# Patient Record
Sex: FEMALE | Race: WHITE | URBAN - METROPOLITAN AREA
[De-identification: names, ages, dates, MRNs, and addresses within clinical notes are randomized per-mention and may not be internally consistent; named-entity substitution may affect disease eponyms.]

---

## 2017-04-21 ENCOUNTER — TELEPHONE (OUTPATIENT)
Dept: OBGYN | Facility: CLINIC | Age: 52
End: 2017-04-21

## 2017-04-21 NOTE — TELEPHONE ENCOUNTER
Records received from [facility] and placed in file cabinet outside ultrasound room.

## 2017-05-04 ENCOUNTER — OFFICE VISIT (OUTPATIENT)
Dept: OBGYN | Facility: CLINIC | Age: 52
End: 2017-05-04
Attending: OBSTETRICS & GYNECOLOGY
Payer: COMMERCIAL

## 2017-05-04 DIAGNOSIS — Z00.00 ANNUAL PHYSICAL EXAM: Primary | ICD-10-CM

## 2017-05-04 DIAGNOSIS — Z01.89 PATIENT REQUEST FOR DIAGNOSTIC TESTING: ICD-10-CM

## 2017-05-04 DIAGNOSIS — Z13.220 LIPID SCREENING: ICD-10-CM

## 2017-05-04 DIAGNOSIS — Z13.29 SCREENING FOR THYROID DISORDER: ICD-10-CM

## 2017-05-04 DIAGNOSIS — Z13.1 SCREENING FOR DIABETES MELLITUS: ICD-10-CM

## 2017-05-04 DIAGNOSIS — Z12.31 ENCOUNTER FOR SCREENING MAMMOGRAM FOR BREAST CANCER: ICD-10-CM

## 2017-05-04 DIAGNOSIS — E56.9 VITAMIN DEFICIENCY: ICD-10-CM

## 2017-05-04 PROCEDURE — 80061 LIPID PANEL: CPT | Performed by: OBSTETRICS & GYNECOLOGY

## 2017-05-04 PROCEDURE — 99213 OFFICE O/P EST LOW 20 MIN: CPT | Mod: ZF

## 2017-05-04 RX ORDER — ZOLPIDEM TARTRATE 10 MG/1
10 TABLET ORAL
COMMUNITY
Start: 2014-12-08

## 2017-05-04 RX ORDER — MULTIPLE VITAMINS W/ MINERALS TAB 9MG-400MCG
1 TAB ORAL DAILY
COMMUNITY

## 2017-05-04 ASSESSMENT — ENCOUNTER SYMPTOMS
SKIN CHANGES: 0
HOT FLASHES: 1
COUGH: 0
SNORES LOUDLY: 0
RESPIRATORY PAIN: 0
DECREASED LIBIDO: 0
COUGH DISTURBING SLEEP: 0
SHORTNESS OF BREATH: 0
WHEEZING: 0
DYSPNEA ON EXERTION: 0
HEMOPTYSIS: 0
SPUTUM PRODUCTION: 1
NAIL CHANGES: 0
POOR WOUND HEALING: 0
POSTURAL DYSPNEA: 0

## 2017-05-04 ASSESSMENT — ANXIETY QUESTIONNAIRES
7. FEELING AFRAID AS IF SOMETHING AWFUL MIGHT HAPPEN: 0 = NOT AT ALL
GAD7 TOTAL SCORE: 0
GAD7 TOTAL SCORE: 0

## 2017-05-04 NOTE — LETTER
Date:May 11, 2017      Patient was self referred, no letter generated. Do not send.        [facility] Health Information

## 2017-05-04 NOTE — PATIENT INSTRUCTIONS
Mammogram within one year of last examination  Present to clinic for laboratory examination prior to departure  Return to clinic within one year or sooner if you have any difficulties

## 2017-05-04 NOTE — LETTER
2017       RE: Rakan Amador  [address] [address]     Dear Colleague,    Thank you for referring your patient, Rakan Amador, to the [facility]. Please see a copy of my visit note below.          [facility]  Annual Exam    HPI:    Rakan Amador is a 51 year old , here for well woman exam.  She is new to this clinic, but has been receiving regular care.    Today, she would like to discuss continued management of her cherri-menopausal symptoms.  She was initially experiencing difficulty with hot flashes and heat 2.5 years ago, but this has greatly improved since her initiation of progesterone therapy.  She would like to trial off of this medication, but would like to resume if her symptoms worsen.       GYN History  - LMP:  17  - Menses: Menarche at 14, Irregular cycles q 3-4 weeks.  Duration 3-4 days.  Consistent quantity of menstruation (not heavier or lighter than usual).  - Pap Smears: Denies abnormal pap smear or procedures on cervix. Last pap smear NILM, HPV negative on 13.  - Contraception: progesterone, s/p tubal ligation  - Sexual Activity: Yes,   - Sexual Concerns: None  - Hx STIs: None  - Hx UTIs: None    OBHx  Obstetric History       T2      TAB0   SAB1   E0   M1   L3       # Outcome Date GA Lbr Parth/2nd Weight Sex Delivery Anes PTL Lv   3 SAB            2 Term            1 Term                   PMHx:   No past medical history on file.    PSHx:   Past Surgical History:   Procedure Laterality Date     BREAST SURGERY      Breast Implant     COLONOSCOPY  2015    Removed two polups     GYN SURGERY  2003    Tubal ligation     Meds:   Current Outpatient Prescriptions   Medication     progesterone (PROMETRIUM) 100 MG capsule     zolpidem (AMBIEN) 10 MG tablet     multivitamin, therapeutic with minerals (MULTI-VITAMIN) TABS tablet     No current  facility-administered medications for this visit.      Herbs: vitamin  D, tumeric, fish oil,     Allergies:    Allergies   Allergen Reactions     Penicillins Rash     SocHx:   No current smoking, quit 8 years ago. Intermittent alcohol. Denies illicit dug use    Social History     Social History     Marital status:      Spouse name: N/A     Number of children: N/A     Years of education: N/A     Occupational History     Not on file.     Social History Main Topics     Smoking status: Former Smoker     Packs/day: 1.50     Types: Cigarettes     Start date: 1/1/1978     Quit date: 10/10/2012     Smokeless tobacco: Not on file     Alcohol use Yes      Comment: occasional glass of wine     Drug use: No     Sexual activity: Yes     Partners: Male     Birth control/ protection: Female Surgical     Other Topics Concern     Not on file     Social History Narrative       FamHx:   Daughter - cervix, s/p CKC  Mother- breast cancer (no genetic testing), stomach cancer  Maternal aunt- breast cancer (no genetic testing)  Father- lung cancer    Denies family history of colon cancer, uterine cancer, ovarian cancer      ROS: 10-Point ROS negative except as noted in HPI    Preventative Health  Current or historical sexual, physical or mental abuse: None  Feelings of depression: None  Seat belt usage: Yes  Diet: varied  Exercise: cardio, weights, yoga, every day    Physical Exam  There were no vitals taken for this visit.  Gen: Well-appearing, NAD  HEENT: Normocephalic, atraumatic  Neck: Thyroid is not enlarged, no appreciable masses palpated. Non-tender  CV:  RRR, no m/r/g auscultated  Pulm: CTAB, no w/r/r auscultated  Abd: Soft, non-tender, non-distended  Ext: No LE edema, extremities warm and well perfused    Breast: Symmetric, implants palpable, no nipple discharge or retraction, no axillary lymphadenopathy, no palpable nodules or masses bilaterally    Pelvic:  Normal appearing external female genitalia. Normal hair  distribution. No cervical motion tenderness. Uterus is small, mobile, non-tender. No adnexal tenderness or masses      --Ideal BMI: 18.5-24.9  Current BMI: There is no height or weight on file to calculate BMI.  --Underweight = <18.5  --Normal weight = 18.5-24.9  --Overweight = 25-29.9  --Obesity = >30    Assessment/Plan  Rakan Amador is a 51 year old No obstetric history on file. female here for annual exam.    1. Routine Health Maintenance:    - Screening blood work: TSH w/reflex T4, Hemoglobin A1c, Lipids, and Vitamin D.      Requesting examination of inflammatory markers and hormones (CRP, ESR, Progesterone, Estrogen, Testosterone. Understands that these tests may not be covered by insurance, but would like to proceed.      Please contact via Support Your Appt with results   - Breast examination without abnormality noted.  Yearly diagnostic breast mammogram ordered.   - Declined STD screening.   - Per-menopausal symptoms: Progesterone. Will trial off for a few days and resume as needed.    - Colonoscopy up-to-date.  Last in 2015. Repeat recommended in 5 years.    2. Cervical cancer screening:   - Last pap smear 5/8/13.  Repeat in one year    Follow Up: 1 year or PRN  Staffed with Dr. Ramsey Paz MD  Ob/Gyn, PGY-2    The patient was seen in resident continuity clinic by Dr. Paz.  I have reviewed the history and exam, the assessment and plan were jointly made.     Nancy Mustafa MD, FACOG      5/4/2017 3:13 PM    Again, thank you for allowing me to participate in the care of your patient.      Sincerely,    Soco Paz MD

## 2017-05-04 NOTE — MR AVS SNAPSHOT
After Visit Summary   5/4/2017    Rakan Amador    MRN: [id number]           Patient Information     Date Of Birth          1965        Visit Information        Provider Department      5/4/2017 2:45 PM Soco Paz MD [facility]        Today's Diagnoses     Encounter for screening mammogram for breast cancer    -  1    Screening for diabetes mellitus        Lipid screening        Screening for thyroid disorder        Vitamin deficiency          Care Instructions    Mammogram within one year of last examination  Present to clinic for laboratory examination prior to departure  Return to clinic within one year or sooner if you have any difficulties        Follow-ups after your visit        Follow-up notes from your care team     Return in about 1 year (around 5/4/2018).      Future tests that were ordered for you today     Open Future Orders        Priority Expected Expires Ordered    Mammo Screening digital (bilateral) Routine  5/4/2018 5/4/2017            Who to contact     Please call your clinic at [phone] to:    Ask questions about your health    Make or cancel appointments    Discuss your medicines    Learn about your test results    Speak to your doctor   If you have compliments or concerns about an experience at your clinic, or if you wish to file a complaint, please contact [facility] Patient Relations at [phone] or email us at [email or web address]         Additional Information About Your Visit        MyChart Information     [employer] gives you secure access to your electronic health record. If you see a primary care provider, you can also send messages to your care team and make appointments. If you have questions, please call your primary care clinic.  If you do not have a primary care provider, please call [phone] and they will assist you.      [employer] is an electronic gateway that provides easy, online access  to your medical records. With [employer], you can request a clinic appointment, read your test results, renew a prescription or communicate with your care team.     To access your existing account, please contact your [facility] or call [phone] for assistance.        Care EveryWhere ID     This is your Care EveryWhere ID. This could be used by other organizations to access your [facility] medical records  [id number]         Blood Pressure from Last 3 Encounters:   No data found for BP    Weight from Last 3 Encounters:   No data found for Wt              We Performed the Following     Hemoglobin A1c     Lipid panel     TSH with free T4 reflex        Primary Care Provider    None Specified       No primary provider on file.        Thank you!     Thank you for choosing [facility]  for your care. Our goal is always to provide you with excellent care. Hearing back from our patients is one way we can continue to improve our services. Please take a few minutes to complete the written survey that you may receive in the mail after your visit with us. Thank you!             Your Updated Medication List - Protect others around you: Learn how to safely use, store and throw away your medicines at www.disposemymeds.org.          This list is accurate as of: 5/4/17  3:31 PM.  Always use your most recent med list.                   Brand Name Dispense Instructions for use    Multi-vitamin Tabs tablet      Take 1 tablet by mouth daily       progesterone 100 MG capsule    PROMETRIUM     Take 100 mg by mouth daily       zolpidem 10 MG tablet    AMBIEN     Take 10 mg by mouth

## 2017-05-04 NOTE — PROGRESS NOTES
[facility]  Annual Exam    HPI:    Rakan Amador is a 51 year old , here for well woman exam.  She is new to this clinic, but has been receiving regular care.    Today, she would like to discuss continued management of her cherri-menopausal symptoms.  She was initially experiencing difficulty with hot flashes and heat 2.5 years ago, but this has greatly improved since her initiation of progesterone therapy.  She would like to trial off of this medication, but would like to resume if her symptoms worsen.       GYN History  - LMP:  17  - Menses: Menarche at 14, Irregular cycles q 3-4 weeks.  Duration 3-4 days.  Consistent quantity of menstruation (not heavier or lighter than usual).  - Pap Smears: Denies abnormal pap smear or procedures on cervix. Last pap smear NILM, HPV negative on 13.  - Contraception: progesterone, s/p tubal ligation  - Sexual Activity: Yes,   - Sexual Concerns: None  - Hx STIs: None  - Hx UTIs: None    OBHx  Obstetric History       T2      TAB0   SAB1   E0   M1   L3       # Outcome Date GA Lbr Parth/2nd Weight Sex Delivery Anes PTL Lv   3 SAB            2 Term            1 Term                   PMHx:   No past medical history on file.    PSHx:   Past Surgical History:   Procedure Laterality Date     BREAST SURGERY      Breast Implant     COLONOSCOPY  2015    Removed two polups     GYN SURGERY      Tubal ligation     Meds:   Current Outpatient Prescriptions   Medication     progesterone (PROMETRIUM) 100 MG capsule     zolpidem (AMBIEN) 10 MG tablet     multivitamin, therapeutic with minerals (MULTI-VITAMIN) TABS tablet     No current facility-administered medications for this visit.      Herbs: vitamin  D, tumeric, fish oil,     Allergies:    Allergies   Allergen Reactions     Penicillins Rash     SocHx:   No current smoking, quit 8 years ago. Intermittent alcohol. Denies illicit dug use    Social History     Social History     Marital  status:      Spouse name: N/A     Number of children: N/A     Years of education: N/A     Occupational History     Not on file.     Social History Main Topics     Smoking status: Former Smoker     Packs/day: 1.50     Types: Cigarettes     Start date: 1/1/1978     Quit date: 10/10/2012     Smokeless tobacco: Not on file     Alcohol use Yes      Comment: occasional glass of wine     Drug use: No     Sexual activity: Yes     Partners: Male     Birth control/ protection: Female Surgical     Other Topics Concern     Not on file     Social History Narrative       FamHx:   Daughter - cervix, s/p CKC  Mother- breast cancer (no genetic testing), stomach cancer  Maternal aunt- breast cancer (no genetic testing)  Father- lung cancer    Denies family history of colon cancer, uterine cancer, ovarian cancer      ROS: 10-Point ROS negative except as noted in HPI    Preventative Health  Current or historical sexual, physical or mental abuse: None  Feelings of depression: None  Seat belt usage: Yes  Diet: varied  Exercise: cardio, weights, yoga, every day    Physical Exam  There were no vitals taken for this visit.  Gen: Well-appearing, NAD  HEENT: Normocephalic, atraumatic  Neck: Thyroid is not enlarged, no appreciable masses palpated. Non-tender  CV:  RRR, no m/r/g auscultated  Pulm: CTAB, no w/r/r auscultated  Abd: Soft, non-tender, non-distended  Ext: No LE edema, extremities warm and well perfused    Breast: Symmetric, implants palpable, no nipple discharge or retraction, no axillary lymphadenopathy, no palpable nodules or masses bilaterally    Pelvic:  Normal appearing external female genitalia. Normal hair distribution. No cervical motion tenderness. Uterus is small, mobile, non-tender. No adnexal tenderness or masses      --Ideal BMI: 18.5-24.9  Current BMI: There is no height or weight on file to calculate BMI.  --Underweight = <18.5  --Normal weight = 18.5-24.9  --Overweight = 25-29.9  --Obesity =  >30    Assessment/Plan  Rakan Amador is a 51 year old No obstetric history on file. female here for annual exam.    1. Routine Health Maintenance:    - Screening blood work: TSH w/reflex T4, Hemoglobin A1c, Lipids, and Vitamin D.      Requesting examination of inflammatory markers and hormones (CRP, ESR, Progesterone, Estrogen, Testosterone. Understands that these tests may not be covered by insurance, but would like to proceed.      Please contact via BrowseLabst with results   - Breast examination without abnormality noted.  Yearly diagnostic breast mammogram ordered.   - Declined STD screening.   - Per-menopausal symptoms: Progesterone. Will trial off for a few days and resume as needed.    - Colonoscopy up-to-date.  Last in 2015. Repeat recommended in 5 years.    2. Cervical cancer screening:   - Last pap smear 5/8/13.  Repeat in one year    Follow Up: 1 year or PRN  Staffed with Dr. Ramsey Paz MD  Ob/Gyn, PGY-2    The patient was seen in resident continuity clinic by Dr. Paz.  I have reviewed the history and exam, the assessment and plan were jointly made.     Nancy Mustafa MD, FACOG      5/4/2017 3:13 PM

## 2017-05-05 ASSESSMENT — ANXIETY QUESTIONNAIRES: GAD7 TOTAL SCORE: 0

## 2017-05-10 DIAGNOSIS — Z13.220 LIPID SCREENING: ICD-10-CM

## 2017-05-10 DIAGNOSIS — E56.9 VITAMIN DEFICIENCY: ICD-10-CM

## 2017-05-10 DIAGNOSIS — Z13.1 SCREENING FOR DIABETES MELLITUS: ICD-10-CM

## 2017-05-10 DIAGNOSIS — Z13.29 SCREENING FOR THYROID DISORDER: ICD-10-CM

## 2017-05-10 DIAGNOSIS — Z01.89 PATIENT REQUEST FOR DIAGNOSTIC TESTING: ICD-10-CM

## 2017-05-10 LAB
CHOLEST SERPL-MCNC: 225 MG/DL
CRP SERPL-MCNC: <2.9 MG/L (ref 0–8)
ERYTHROCYTE [SEDIMENTATION RATE] IN BLOOD BY WESTERGREN METHOD: 8 MM/H (ref 0–30)
ESTRADIOL SERPL-MCNC: 213 PG/ML
HBA1C MFR BLD: 5.1 % (ref 4.3–6)
HDLC SERPL-MCNC: 101 MG/DL
LDLC SERPL CALC-MCNC: 113 MG/DL
NONHDLC SERPL-MCNC: 124 MG/DL
PROGEST SERPL-MCNC: 9.8 NG/ML
TRIGL SERPL-MCNC: 56 MG/DL
TSH SERPL DL<=0.005 MIU/L-ACNC: 2.25 MU/L (ref 0.4–4)

## 2017-05-10 PROCEDURE — 84270 ASSAY OF SEX HORMONE GLOBUL: CPT | Performed by: ADVANCED PRACTICE MIDWIFE

## 2017-05-10 PROCEDURE — 82670 ASSAY OF TOTAL ESTRADIOL: CPT | Performed by: ADVANCED PRACTICE MIDWIFE

## 2017-05-10 PROCEDURE — 84403 ASSAY OF TOTAL TESTOSTERONE: CPT | Performed by: ADVANCED PRACTICE MIDWIFE

## 2017-05-10 PROCEDURE — 84443 ASSAY THYROID STIM HORMONE: CPT | Performed by: ADVANCED PRACTICE MIDWIFE

## 2017-05-10 PROCEDURE — 86140 C-REACTIVE PROTEIN: CPT | Performed by: ADVANCED PRACTICE MIDWIFE

## 2017-05-10 PROCEDURE — 84144 ASSAY OF PROGESTERONE: CPT | Performed by: ADVANCED PRACTICE MIDWIFE

## 2017-05-10 PROCEDURE — 82306 VITAMIN D 25 HYDROXY: CPT | Performed by: ADVANCED PRACTICE MIDWIFE

## 2017-05-10 PROCEDURE — 80061 LIPID PANEL: CPT | Performed by: ADVANCED PRACTICE MIDWIFE

## 2017-05-10 PROCEDURE — 85652 RBC SED RATE AUTOMATED: CPT | Performed by: ADVANCED PRACTICE MIDWIFE

## 2017-05-10 PROCEDURE — 36415 COLL VENOUS BLD VENIPUNCTURE: CPT | Performed by: ADVANCED PRACTICE MIDWIFE

## 2017-05-10 PROCEDURE — 83036 HEMOGLOBIN GLYCOSYLATED A1C: CPT | Performed by: ADVANCED PRACTICE MIDWIFE

## 2017-05-11 LAB — DEPRECATED CALCIDIOL+CALCIFEROL SERPL-MC: 64 UG/L (ref 20–75)

## 2017-05-12 LAB
SHBG SERPL-SCNC: 72 NMOL/L (ref 30–135)
TESTOST FREE SERPL-MCNC: 0.21 NG/DL (ref 0.06–0.38)
TESTOST SERPL-MCNC: 22 NG/DL (ref 8–60)

## 2017-05-23 DIAGNOSIS — G47.00 INSOMNIA: Primary | ICD-10-CM

## 2017-05-23 RX ORDER — ZOLPIDEM TARTRATE 10 MG/1
10 TABLET ORAL
Qty: 30 TABLET | Status: CANCELLED | OUTPATIENT
Start: 2017-05-23

## 2017-05-23 NOTE — TELEPHONE ENCOUNTER
Pt requesting prescription for ambien. She saw Dr. Paz for her annual 5.4.17 and forgot to ask for a prescription. She states she was being prescribed this by her former pcp in [place].

## 2017-05-24 ENCOUNTER — TELEPHONE (OUTPATIENT)
Dept: OBGYN | Facility: CLINIC | Age: 52
End: 2017-05-24

## 2017-05-24 NOTE — TELEPHONE ENCOUNTER
Spoke to Rakan who was wondering about her ambien request from yesterday.  She uses it to help manage menopause s/s at . She saw Dr Paz for annual 5/4/17 and forgot to ask her to order it.  She is [facility] to [facility].    I told her I'd forward to OB Resident pool. IF they order ambien,she wants it sent to [facility] in [facility]

## 2017-05-26 NOTE — TELEPHONE ENCOUNTER
Spoke with Rakan and advised she schedule a return visit to discuss ambien prescription as this is not typically prescribed for menopausal symptoms and want to ensure she is getting proper treatment. She states she uses ambien about twice a week when having trouble sleeping.     She understood and agreed to this plan. Forwarded her to scheduling line.

## 2017-05-31 ENCOUNTER — OFFICE VISIT (OUTPATIENT)
Dept: OBGYN | Facility: CLINIC | Age: 52
End: 2017-05-31
Payer: COMMERCIAL

## 2017-05-31 VITALS
DIASTOLIC BLOOD PRESSURE: 61 MMHG | HEART RATE: 71 BPM | BODY MASS INDEX: 22.75 KG/M2 | WEIGHT: 120.5 LBS | SYSTOLIC BLOOD PRESSURE: 102 MMHG | HEIGHT: 61 IN

## 2017-05-31 DIAGNOSIS — N93.9 ABNORMAL UTERINE BLEEDING: Primary | ICD-10-CM

## 2017-05-31 DIAGNOSIS — G47.9 SLEEP DIFFICULTIES: ICD-10-CM

## 2017-05-31 PROCEDURE — 99212 OFFICE O/P EST SF 10 MIN: CPT | Mod: ZF

## 2017-05-31 RX ORDER — MAGNESIUM GLUCONATE
POWDER (GRAM) MISCELLANEOUS
COMMUNITY
Start: 2016-02-01 | End: 2017-05-31

## 2017-05-31 RX ORDER — ZOLPIDEM TARTRATE 5 MG/1
2.5 TABLET ORAL
Qty: 24 TABLET | Refills: 3 | Status: SHIPPED | OUTPATIENT
Start: 2017-05-31 | End: 2017-12-18

## 2017-05-31 ASSESSMENT — PAIN SCALES - GENERAL: PAINLEVEL: NO PAIN (0)

## 2017-05-31 NOTE — PATIENT INSTRUCTIONS
Schedule your ultrasound at the .  We will call you regarding results of the ultrasound and to scheduled a follow up visit if needed.

## 2017-05-31 NOTE — PROGRESS NOTES
"[facility]  Gynecology Visit follow up    Subjective   Rakan Amador is a 51 year old , here for follow up of perimenopausal symptoms. She has been taking Progesterone for the past two years and is has been very effective for hot flashes, irritability, and helping her sleep. She has some nights where she has difficulty staying asleep and getting back to sleep. She has some relief using yoga, meditation, avoiding alcohol and coffee, not using electronic devices, writing in her journal, and keeping the room dark. She has used melatonin and herbal supplements in the past. She states she \"could write a book about\" methods to help with sleep. 1-2 times per week, she has trouble staying asleep despite the above interventions, and she uses one quarter of a 10mg tablet of Ambien (2.5mg) to help her return to sleep. She does not experience daytime sleepiness, decreased alertness, or sleep walking. She has not seen her primary doctor in several years and is requesting a refill of the Ambien.     She also reports since January, she has had intermenstrual spotting. She has been using Progesterone 200mg daily for the past two years and has not had issues with spotting until January. Her LMP was 2017, and she bled for 4-5 days. She had a few days of amenorrhea but since then she has had daily spotting.     Objective  /61 (BP Location: Right arm, Patient Position: Chair, Cuff Size: Adult Regular)  Pulse 71  Ht 1.549 m (5' 1\")  Wt 54.7 kg (120 lb 8 oz)  BMI 22.77 kg/m2  Gen: Well-appearing, NAD    Assessment  Rakan Amador is a 51 year old  female here for follow up of perimenopausal symptoms and abnormal uterine bleeding    Plan  1. Insomnia  1. Encouraged continued sleep hygiene  2. Offered sleep study, which she declines at this time.    3. Discussed risks of Ambien use including dependence, daytime sleepiness, decreased alertness, and sleep walking. She was given a 3 month prescription of " Ambien (2.5mg) with 3 refills.     2. Abnormal uterine bleeding  1. New bleeding despite being on progesterone for several years.   2. Differential includes structural (polyp, fibroid), hyperplasia or cancer, or perimenopausal bleeding. TSH wnl earlier this month.  3. Ultrasound ordered. Prefers to be called regarding results.   1. If lining is atrophic, consider cyclic progesterone  2. If lining is thick, consider increasing dose of progesterone    Call patient regarding ultrasound result and schedule appointment depending on results.     Staffed with Dr. Genaro Vasquez MD   Resident Physician, PGY1  Obstetrics, Gynecology, and Women's Health    The Patient was seen in Resident Continuity Clinic by DIAN VASQUEZ.  I reviewed the history & exam. Assessment and plan were jointly made.    Apple Lam MD

## 2017-07-27 DIAGNOSIS — N95.1 PERIMENOPAUSAL: Primary | ICD-10-CM

## 2017-07-27 NOTE — TELEPHONE ENCOUNTER
Received refill request for patients progesterone. Last seen in clinic 5/31/17 and plan was to have an ultrasound to determine ongoing use of progesterone based on uterine lining. See plan below:   2. Abnormal uterine bleeding  1. New bleeding despite being on progesterone for several years.   2. Differential includes structural (polyp, fibroid), hyperplasia or cancer, or perimenopausal bleeding. TSH wnl earlier this month.  3. Ultrasound ordered. Prefers to be called regarding results.   1. If lining is atrophic, consider cyclic progesterone  2. If lining is thick, consider increasing dose of progesterone    Per patient she has not had any breakthrough bleeding since her visit, so she is wondering if she still needs to have this done. Will route to Dr. Lam

## 2017-11-27 DIAGNOSIS — N95.1 PERIMENOPAUSAL: ICD-10-CM

## 2017-11-27 NOTE — TELEPHONE ENCOUNTER
Received refill request for patients prometrium. Last 7/27/17. She is still not having any breakthrough bleeding and plan during time of last refill was to forgo the follow up ultrasound at that time. Since there has been no change in plan, will refill until pt is due for next clinic appt 5/2018. Instructed her to call if anything changes. She understood plan and had no further questions.

## 2017-12-18 DIAGNOSIS — G47.9 SLEEP DIFFICULTIES: ICD-10-CM

## 2017-12-18 RX ORDER — ZOLPIDEM TARTRATE 5 MG/1
2.5 TABLET ORAL
Qty: 24 TABLET | Refills: 3 | Status: SHIPPED | OUTPATIENT
Start: 2017-12-18

## 2017-12-18 NOTE — TELEPHONE ENCOUNTER
Received refill request for ambien.  Last in clinic 5/2017 and problems with sleep discussed .Rakan uses ambien,2.5 mg tab 1-2 week after other measures don't help.  Discussed with Dr Waddell in clinic and refill approved.    Refill called into [facility] pharmacy

## 2018-01-21 ENCOUNTER — HEALTH MAINTENANCE LETTER (OUTPATIENT)
Age: 53
End: 2018-01-21

## 2019-09-16 ENCOUNTER — HOSPITAL ENCOUNTER (OUTPATIENT)
Dept: GENERAL RADIOLOGY | Facility: CLINIC | Age: 54
Discharge: HOME | End: 2019-09-16
Attending: OBSTETRICS & GYNECOLOGY | Admitting: OBSTETRICS & GYNECOLOGY
Payer: COMMERCIAL

## 2019-09-16 DIAGNOSIS — Z87.891 HISTORY OF SMOKING: ICD-10-CM

## 2019-09-16 PROCEDURE — 71046 X-RAY EXAM CHEST 2 VIEWS: CPT

## 2020-03-10 ENCOUNTER — HEALTH MAINTENANCE LETTER (OUTPATIENT)
Age: 55
End: 2020-03-10

## 2020-12-27 ENCOUNTER — HEALTH MAINTENANCE LETTER (OUTPATIENT)
Age: 55
End: 2020-12-27

## 2021-04-24 ENCOUNTER — HEALTH MAINTENANCE LETTER (OUTPATIENT)
Age: 56
End: 2021-04-24

## 2021-10-09 ENCOUNTER — HEALTH MAINTENANCE LETTER (OUTPATIENT)
Age: 56
End: 2021-10-09

## 2022-03-26 ENCOUNTER — HEALTH MAINTENANCE LETTER (OUTPATIENT)
Age: 57
End: 2022-03-26

## 2022-05-21 ENCOUNTER — HEALTH MAINTENANCE LETTER (OUTPATIENT)
Age: 57
End: 2022-05-21

## 2022-09-11 ENCOUNTER — HEALTH MAINTENANCE LETTER (OUTPATIENT)
Age: 57
End: 2022-09-11

## 2023-10-07 ENCOUNTER — HEALTH MAINTENANCE LETTER (OUTPATIENT)
Age: 58
End: 2023-10-07

## 2023-12-16 ENCOUNTER — HEALTH MAINTENANCE LETTER (OUTPATIENT)
Age: 58
End: 2023-12-16